# Patient Record
(demographics unavailable — no encounter records)

---

## 2024-11-11 NOTE — DISCUSSION/SUMMARY
[FreeTextEntry1] : Reassure patient.  I just Wegovy.  Labs sent including lipids, hemoglobin A1c, proBNP.  If all okay will follow-up in 6 months as he returns to Dr. Austin his PCP and has a follow-up appointment coming up with his endocrinologist. [EKG obtained to assist in diagnosis and management of assessed problem(s)] : EKG obtained to assist in diagnosis and management of assessed problem(s)

## 2024-11-11 NOTE — CARDIOLOGY SUMMARY
[de-identified] : December 6, 2022.  Exercised 11 minutes to a heart rate of 160 and a blood pressure of 158/98.  Fatigue but no chest pain.  Minimal upsloping ST depressions not meeting criteria for ischemia and returning to baseline by 1 minute recovery.  Rare VPCs with 1 couplet in recovery. [de-identified] : December 6, 2022.  MAC with mild MR.  Calcified trileaflet aortic valve with normal opening.  No AI.  Aorta 3.6 at the sinus of Valsalva and 3.5 cm ascending aorta.  Normal left atrium.  Normal LV size and systolic function with LVEF 72%.  Normal diastolic function.  Normal RV size and function with mild TR.  RVSP 37.  Normal pericardium with no effusion.

## 2024-11-11 NOTE — ASSESSMENT
[FreeTextEntry1] : 69-year-old with multiple risk factors including family history. Fatigue since COVID. EKG with some nonspecific changes and may be minimal IVCD. No significant findings on exam. Blood pressure had been controlled until he had to stop the diuretic part of his Hyzaar because of 2 gout attacks. Now blood pressure running high even on 100 mg of losartan. I added 5 mg of amlodipine and cut the losartan back to 50. Warned him about possible edema and reassured him it is not dangerous if he does get some. His blood pressure continues to run high so he saw a rheumatologist who gave him allopurinol 100 mg daily (along with a short course of colchicine 0.6 daily) and therefore he went back on his own to [facility] and has had excellent blood pressure response once again. He is happy staying with that even if he gets erectile dysfunction from the diuretic. He did get a blood pressure cuff at home and his numbers are mostly in the 120s although occasionally diastolic is high 80s; he will bring the cuff with him on his next visit with Dr. Austin or his follow-up visit with me in November which will be 1 year from his last visit. He had recent labs with Dr. Austin so I will obtain those and review them as well. Patient returned January 16, 2024. Blood pressure has been good with the diuretic part of Hyzaar and he has not had a recurrence of any gout while taking allopurinol 200 mg a day. No significant symptoms other than his current URI for which she is on prednisone as mentioned above related to his previous lung damage from COVID. He asked about Ozempic or 1 of those medicines just for a few months and I reiterated how everyone who stops them puts the weight back but they might be some logic for staying on it long-term if he has enough cardiac risk factors, metabolic syndrome, fatty liver etc. He was also very interested in a coronary artery calcium score. He was part of a study at [facility] over 30 years ago and he had a low score then but not 0. I explained that it was not likely things will change much based on the score although perhaps we would get even stricter about his LDL level etc. and he wanted to test so I gave him a requisition for it and a phone number to schedule it through [facility]. The meantime labs were sent and we will review his medicines and adjust them based on his labs and his calcium score. His EKG was sinus rhythm at 68 and otherwise unremarkable. Patient returned April 12, 2024 after gradual workup for calcium score to CTA finally led to coronary angiography and he had 90% lesion in both his proximal and mid LAD that were both treated with drug-eluting stents. He is doing fine with just some soreness in his right arm. His exam is unremarkable except he has now lost 15 pounds with GLP-1 agonists. Blood pressure is excellent. Good pedal pulses. EKG is normal sinus rhythm and remains in normal tracing. Reviewed all of the current issues. We will try to keep his LDL below 70 so we may have to increase his atorvastatin depending on today's results. He now wants to try to get approved for Wegovy because of cost. We discussed issues involving dual antiplatelet therapy going forward which we will try to continue for 6 months. Patient returned July 30, 2024. He has lost 31 pounds between diet and Wegovy now 1.0 mg weekly. No cardiac symptoms. Because of the weight loss and his blood pressure numbers at home he decided to stop his Hyzaar 2 weeks before his appointment with me. His home readings are still excellent and the reading I got here was satisfactory as well so it is okay to stay off the Hyzaar for now. He will monitor his blood pressure at home just a couple of times a week and call me if he notices it going up. He claims his A1c was down to 4 back in April from the Wegovy but he has not had any visual or retinal issues. His EKG remains sinus rhythm and within normal limits.  Patient returns today November 11, 2024.  He had his Wegovy increased to 1.7 mg weekly but now feels like he is losing too much weight and wants to cut back.  He will discuss this with his endocrinologist coming up soon and if that is okay I will cut him back to 1.0 mg weekly.  No cardiac symptoms or complaints.  His EKG is sinus rhythm at 68 and otherwise within normal limits.  He is now more than 6 months since his stent so he no longer needs DAPT so we decided to stop the aspirin and just continue the clopidogrel (which might also be better for his GERD).  Still anxious about his fluctuating blood pressure and again I tried to reassure him.  I am perfectly happy with his blood pressure of 124/80 at the end of the exam today.

## 2024-11-11 NOTE — PHYSICAL EXAM
[Well Developed] : well developed [Well Nourished] : well nourished [No Acute Distress] : no acute distress [Normal Conjunctiva] : normal conjunctiva [Normal Venous Pressure] : normal venous pressure [No Carotid Bruit] : no carotid bruit [Normal S1, S2] : normal S1, S2 [No Murmur] : no murmur [No Rub] : no rub [No Gallop] : no gallop [Clear Lung Fields] : clear lung fields [Good Air Entry] : good air entry [No Respiratory Distress] : no respiratory distress  [Soft] : abdomen soft [Non Tender] : non-tender [No Masses/organomegaly] : no masses/organomegaly [Normal Bowel Sounds] : normal bowel sounds [Normal Gait] : normal gait [No Edema] : no edema [No Cyanosis] : no cyanosis [No Clubbing] : no clubbing [No Varicosities] : no varicosities [Normal Radial B/L] : normal radial B/L [Normal PT B/L] : normal PT B/L [Normal DP B/L] : normal DP B/L [No Rash] : no rash [No Skin Lesions] : no skin lesions [Moves all extremities] : moves all extremities [No Focal Deficits] : no focal deficits [Normal Speech] : normal speech [Normal] : alert and oriented, normal memory [Alert and Oriented] : alert and oriented [Normal memory] : normal memory [de-identified] : Mild central obesity much less [de-identified] : Right radial pulse okay

## 2024-11-11 NOTE — REASON FOR VISIT
[FreeTextEntry1] : 69-year-old man with family history and other risk factors for coronary disease. Now status post 2 stents to his LAD April 9, 2024

## 2024-11-11 NOTE — REVIEW OF SYSTEMS
[Weight Loss (___ Lbs)] : [unfilled] ~Ulb weight loss [Dyspnea on exertion] : dyspnea during exertion [Negative] : Heme/Lymph [Weight Gain (___ Lbs)] : no recent weight gain [Feeling Fatigued] : not feeling fatigued [Chest Discomfort] : no chest discomfort [Lower Ext Edema] : no extremity edema [Palpitations] : no palpitations [Orthopnea] : no orthopnea [PND] : no PND [Syncope] : no syncope [FreeTextEntry5] : See HPI [FreeTextEntry6] : SANJAY [FreeTextEntry8] : History of kidney stones

## 2024-11-11 NOTE — HISTORY OF PRESENT ILLNESS
[FreeTextEntry1] : 2022.  Patient here for cardiac evaluation.  He is almost 68 years old.  His father  at 54 and had had multiple bypass surgeries although he was a 4 pack a day smoker.  His mother was found dead during COVID unclear etiology she was 89.  He has 1 sister that has hyperlipidemia.  Patient himself has well-controlled hypertension and hyperlipidemia and thinks he may have borderline diabetes.  He had a couple of admissions or evaluations in the emergency room to rule out heart attacks mostly due to his anxiety and he did have a normal nuclear stress test back then.  Most recent was 2 years ago right before COVID he saw cardiologist and had a plain stress test which was negative and he reassured him.  Patient had COVID early on in 2020 with bilateral pneumonia, 1 day hospitalization, follow-up with pulmonary with frequent chest CTs until the scar tissue cleared.  He has been fatigued he thinks since then.  He is also concerned because he does have dyspnea on exertion but no specific chest pain syndrome.  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  He is currently on Hyzaar and Lipitor.  His EKG is sinus rhythm at 68 and mostly within normal limits.  He has obstructive sleep apnea and has been using his machine for 15 years he thinks successfully 2022. Patient return for stress test and echocardiogram.  His echo had mild MR, and mildly calcified aortic valve but normal opening and no AI, normal LV and RV function.  RVSP 37.  Normal diastolic function.  He was able to exercise for 11 minutes to a heart rate of 160 and had fatigue but no chest pain.  Just minimal upsloping ST depressions not meeting criteria for ischemia and returning to baseline by 1 minute recovery. I reviewed all the above with the patient.  We will repeat the echo in 1 year, may be a stress test as well depending on his visit at that time.  He wanted to go over again his lab results which include an HDL of 39 but his LDL is 50 and his hemoglobin A1c is 5.4 so I am very satisfied with these results.  proBNP was normal as well.  2023.Patient called concerned because he had a chest x-ray with Dr. Austin that showed atherosclerotic calcification of the thoracic aorta.  I reassured him that this is a common finding, that does not mean he has intravascular obstruction, and that we just continue monitoring his risk factors controlling his cholesterol sugar and blood pressure etc. and he just had a recent stress test as well.  May 18, 2023.  Patient here in follow-up.  A lot going on in the interim.  Had a bout with transient global amnesia and then had another bout where he went to the emergency room thinking he had appendicitis but the scan was negative and everything resolved.  He also had a couple of gout attacks so finally his Hyzaar was changed to plain losartan but on 50 mg his blood pressure was high.  Dr. Austin increased him to 100 mg but the blood pressure still stayed high.  So now he was sent back here for further evaluation.  Only other issue is some anemia and his gastroenterologist moved up his colonoscopy from next February to next month.  No issues with chest pain shortness of breath etc. 2023.  Patient returned because he wanted to discuss blood pressure issues.  It seems on the new regimen I put him on without the diuretic his blood pressure remained elevated.  He saw a rheumatologist who agreed to give him allopurinol 100 mg along with a brief course of colchicine 0.6, and let him go back on the diuretic.  He went back to Hyzaar and his blood pressure numbers have been excellent.  If anything they are a little higher at home on his home monitor that he brought so he will bring it in next time either with me or Dr. Austin to make sure of its accuracy.  We also reviewed a number of other issues as well as some of which we had discussed previously 2024.  Patient returns in follow-up.  Doing well overall except has bronchitis and ever since he had COVID which damaged his lungs whenever he gets a cold it turns into bed bronchitis so he is now on prednisone 20 mg a day for the last 4 days.  Otherwise no interval medical or cardiac issues.  He did at the end ask about a coronary calcium score and after discussing the pros and cons he would still like to have one done.  No other interval medical issues hospitalizations etc.  We reviewed all his medications and he is on a good blood pressure regimen with Hyzaar 50/12.5 right now.  Still on atorvastatin 20. 2024 Had calcium score of 848, with 31 in the left main, 425 LAD, 61 left circumflex, 331 RCA. Last stress echo was 2022 with some upsloping ST depressions and a negative echo. Reviewed results with patient and we will schedule another stress echo. 2024 Patient came for stress echo due to the very high coronary artery calcium score. He was able to exercise for 9 minutes and 40 seconds to a heart rate of 150 and a blood pressure of 206/98. No EKG changes just mildly frequent VPCs. No echocardiographic evidence of ischemia. Patient at peak had small area of chest pain which then resolved very early in recovery. It looks like the NSTs were maybe starting to go down but they went back to baseline at just 30 seconds of recovery and stayed there throughout recovery and as mentioned again no echo evidence of ischemia. After discussion with the patient on the one hand it seems the symptom was very fleeting and mild and he has had it before without exertion or by the same token he was very concerned with a high score and with his family history and he wanted to do a CT angio of the coronaries. After much discussion I agreed.  2024.CTA of the coronaries done. Suggestive of at least moderate stenosis in proximal to mid LAD, moderate to severe in distal LAD, and severe and distal circumflex. Long discussion with patient. Prefers to have angiogram which I will schedule for him. In the meantime has lost 15 pounds with his GLP-1 agonist and would be interested in changing to Wegovy as it does have an indication for cardiac disease as well. 2024.  Cath on  showed a 90% proximal LAD stenosis and a 90% mid LAD stenosis that were both treated with drug-eluting stents. There was also a 30% stenosis in the mid RCA and 40% in the distal RCA. Patient was discharged without problems and will be seeing me in follow-up before he goes away. DAPT for 6 months. 2024. Patient here today.  EKG is sinus rhythm at 24 and a normal tracing.  Blood pressure excellent.  Weight is down about 15 pounds. His arm is a little sore this in the right arm (he says he felt the catheter going up his arm and it was a little uncomfortable) but I reassured him that that will probably go away in a few days.  We discussed the dual antiplatelet therapy.  He has been taking Zepound but he is trying to switch over to Wegovy 0.5 mg weekly and we will try to prescribe that.  We also discussed getting his LDL below 70 and we discussed his mildly elevated ESQUIVEL score in January but given his weight loss he wants to wait and repeat that may be in 6 months or longer.  2024.  Patient returns in follow-up. He has continued to lose weight with diet and Wegovy now 1.0 mg.  Because his weight was coming down he stopped his Hyzaar and made an appointment to come see me 2 weeks later to see about his blood pressure.  His home numbers have been very good although starting to creep up a little and that got him nervous.  Absolutely no cardiac symptoms with exertion etc.  No other change in his medications. His EKG is sinus rhythm at 60 and a totally normal tracing. 2024.  Patient returns in follow-up.  He is doing so well on Wegovy in terms of weight loss that he does not want to lose anymore.  First he tried to make it every other week but that did not seem to agree with him so he is asking about cutting back to 1.0 mg.  He is seeing his endocrinologist later on this week about his thyroid so I told him to check with the endocrinologist as well but most likely that is what we will do.  Meanwhile it is more than 6 months now since his stents so we will stop aspirin and just continue the clopidogrel.  He is in good spirits with no other complaints or issues but always worried about his blood pressure and again I reassured him about fluctuations in blood pressure etc.  His repeat blood pressure at the end of the exam today was perfectly normal.  He has no chest pain shortness of breath etc.

## 2025-03-03 NOTE — ASSESSMENT
[FreeTextEntry1] : 70-year-old with multiple risk factors including family history. Fatigue since COVID. EKG with some nonspecific changes and may be minimal IVCD. No significant findings on exam. Blood pressure had been controlled until he had to stop the diuretic part of his Hyzaar because of 2 gout attacks. Now blood pressure running high even on 100 mg of losartan. I added 5 mg of amlodipine and cut the losartan back to 50. Warned him about possible edema and reassured him it is not dangerous if he does get some. His blood pressure continues to run high so he saw a rheumatologist who gave him allopurinol 100 mg daily (along with a short course of colchicine 0.6 daily) and therefore he went back on his own to [facility] and has had excellent blood pressure response once again. He is happy staying with that even if he gets erectile dysfunction from the diuretic. He did get a blood pressure cuff at home and his numbers are mostly in the 120s although occasionally diastolic is high 80s; he will bring the cuff with him on his next visit with Dr. Austin or his follow-up visit with me in November which will be 1 year from his last visit. He had recent labs with Dr. Austin so I will obtain those and review them as well. Patient returned January 16, 2024. Blood pressure has been good with the diuretic part of Hyzaar and he has not had a recurrence of any gout while taking allopurinol 200 mg a day. No significant symptoms other than his current URI for which she is on prednisone as mentioned above related to his previous lung damage from COVID. He asked about Ozempic or 1 of those medicines just for a few months and I reiterated how everyone who stops them puts the weight back but they might be some logic for staying on it long-term if he has enough cardiac risk factors, metabolic syndrome, fatty liver etc. He was also very interested in a coronary artery calcium score. He was part of a study at [facility] over 30 years ago and he had a low score then but not 0. I explained that it was not likely things will change much based on the score although perhaps we would get even stricter about his LDL level etc. and he wanted to test so I gave him a requisition for it and a phone number to schedule it through [facility]. The meantime labs were sent and we will review his medicines and adjust them based on his labs and his calcium score. His EKG was sinus rhythm at 68 and otherwise unremarkable. Patient returned April 12, 2024 after gradual workup for calcium score to CTA finally led to coronary angiography and he had 90% lesion in both his proximal and mid LAD that were both treated with drug-eluting stents. He is doing fine with just some soreness in his right arm. His exam is unremarkable except he has now lost 15 pounds with GLP-1 agonists. Blood pressure is excellent. Good pedal pulses. EKG is normal sinus rhythm and remains in normal tracing. Reviewed all of the current issues. We will try to keep his LDL below 70 so we may have to increase his atorvastatin depending on today's results. He now wants to try to get approved for Wegovy because of cost. We discussed issues involving dual antiplatelet therapy going forward which we will try to continue for 6 months. Patient returned July 30, 2024. He has lost 31 pounds between diet and Wegovy now 1.0 mg weekly. No cardiac symptoms. Because of the weight loss and his blood pressure numbers at home he decided to stop his Hyzaar 2 weeks before his appointment with me. His home readings are still excellent and the reading I got here was satisfactory as well so it is okay to stay off the Hyzaar for now. He will monitor his blood pressure at home just a couple of times a week and call me if he notices it going up. He claims his A1c was down to 4 back in April from the Wegovy but he has not had any visual or retinal issues. His EKG remains sinus rhythm and within normal limits. Patient returned November 11, 2024. He had his Wegovy increased to 1.7 mg weekly but now feels like he is losing too much weight and wants to cut back. He will discuss this with his endocrinologist coming up soon and if that is okay I will cut him back to 1.0 mg weekly. No cardiac symptoms or complaints. His EKG is sinus rhythm at 68 and otherwise within normal limits. He is now more than 6 months since his stent so he no longer needs DAPT so we decided to stop the aspirin and just continue the clopidogrel (which might also be better for his GERD). Still anxious about his fluctuating blood pressure and again I tried to reassure him. I am perfectly happy with his blood pressure of 124/80 at the end of the exam today.      Patient returns today March 3, 2025 needing cardiac clearance for right arthroscopic shoulder surgery and had an echocardiogram as well.  He is stable and asymptomatic with no exertional chest pain or shortness of breath.  Only issue was an episode of epistaxis about 3 to 4 weeks ago which resolved.  He remains on Plavix but will switch to baby aspirin 8 days prior to the surgery because of his LAD stent..  He has lost a lot of weight on Wegovy but will stop that preop as per the orthopedic surgeons wishes.  His exam today is unremarkable and his EKG shows normal sinus rhythm at 65 and is a normal tracing.  He had an echocardiogram that he was scheduled for showing normal LV systolic function with an ejection fraction of 65 to 70% with no wall motion abnormalities.  Just has mild MR and mild TR and this was basically unchanged from 2 years ago.

## 2025-03-03 NOTE — CARDIOLOGY SUMMARY
[de-identified] : December 6, 2022.  Exercised 11 minutes to a heart rate of 160 and a blood pressure of 158/98.  Fatigue but no chest pain.  Minimal upsloping ST depressions not meeting criteria for ischemia and returning to baseline by 1 minute recovery.  Rare VPCs with 1 couplet in recovery. [de-identified] : December 6, 2022.  MAC with mild MR.  Calcified trileaflet aortic valve with normal opening.  No AI.  Aorta 3.6 at the sinus of Valsalva and 3.5 cm ascending aorta.  Normal left atrium.  Normal LV size and systolic function with LVEF 72%.  Normal diastolic function.  Normal RV size and function with mild TR.  RVSP 37.  Normal pericardium with no effusion.

## 2025-03-03 NOTE — HISTORY OF PRESENT ILLNESS
[Preoperative Visit] : for a medical evaluation prior to surgery [Scheduled Procedure ___] : a [unfilled] [Date of Surgery ___] : on [unfilled] [Surgeon Name ___] : surgeon: [unfilled] [Fever] : no fever [Chills] : no chills [Fatigue] : no fatigue [Chest Pain] : no chest pain [Cough] : no cough [Dyspnea] : no dyspnea [Dysuria] : no dysuria [Urinary Frequency] : no urinary frequency [Nausea] : no nausea [Vomiting] : no vomiting [Diarrhea] : no diarrhea [Abdominal Pain] : no abdominal pain [Easy Bruising] : no easy bruising [Lower Extremity Swelling] : no lower extremity swelling [Poor Exercise Tolerance] : no poor exercise tolerance [Diabetes] : no diabetes [Cardiovascular Disease] : cardiovascular disease [Pulmonary Disease] : no pulmonary disease [Anti-Platelet Agents] : anti-platelet agents [Nicotine Dependence] : no nicotine dependence [Alcohol Use] : no  alcohol use [Renal Disease] : no renal disease [GI Disease] : no gastrointestinal disease [Sleep Apnea] : no sleep apnea [Thromboembolic Problems] : no thromboembolic problems [Frequent use of NSAIDs] : no use of NSAIDs [Transfusion Reaction] : no transfusion reaction [Impaired Immunity] : no impaired immunity [Steroid Use in Last 6 Months] : no steroid use in the last six months [Frequent Aspirin Use] : frequent aspirin use [Prior Anesthesia] : Prior anesthesia [Prev Anesthesia Reaction] : no previous anesthesia reaction [Electrocardiogram] : ~T an ECG ~C was performed [Echocardiogram] : ~T an echocardiogram ~C was performed [Cardiac Catheterization  (Diagnostic)] : cardiac catheterization ~T ~C was performed [Cardiovascular Stress Test] : a cardiac stress test ~T ~C was performed [Metabolic Capacity ___Mets%] : The patient has a metabolic capacity of [unfilled] Mets%  [Good] : Good [Anesthesia Reaction] : no anesthesia reaction [Sudden Death] : no sudden death [Clotting Disorder] : no clotting disorder [Bleeding Disorder] : no bleeding disorder [de-identified] : Recent epistaxis [FreeTextEntry1] : 2022.  Patient here for cardiac evaluation.  He is almost 68 years old.  His father  at 54 and had had multiple bypass surgeries although he was a 4 pack a day smoker.  His mother was found dead during COVID unclear etiology she was 89.  He has 1 sister that has hyperlipidemia.  Patient himself has well-controlled hypertension and hyperlipidemia and thinks he may have borderline diabetes.  He had a couple of admissions or evaluations in the emergency room to rule out heart attacks mostly due to his anxiety and he did have a normal nuclear stress test back then.  Most recent was 2 years ago right before COVID he saw cardiologist and had a plain stress test which was negative and he reassured him.  Patient had COVID early on in 2020 with bilateral pneumonia, 1 day hospitalization, follow-up with pulmonary with frequent chest CTs until the scar tissue cleared.  He has been fatigued he thinks since then.  He is also concerned because he does have dyspnea on exertion but no specific chest pain syndrome.  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  He is currently on Hyzaar and Lipitor.  His EKG is sinus rhythm at 68 and mostly within normal limits.  He has obstructive sleep apnea and has been using his machine for 15 years he thinks successfully 2022. Patient return for stress test and echocardiogram.  His echo had mild MR, and mildly calcified aortic valve but normal opening and no AI, normal LV and RV function.  RVSP 37.  Normal diastolic function.  He was able to exercise for 11 minutes to a heart rate of 160 and had fatigue but no chest pain.  Just minimal upsloping ST depressions not meeting criteria for ischemia and returning to baseline by 1 minute recovery. I reviewed all the above with the patient.  We will repeat the echo in 1 year, may be a stress test as well depending on his visit at that time.  He wanted to go over again his lab results which include an HDL of 39 but his LDL is 50 and his hemoglobin A1c is 5.4 so I am very satisfied with these results.  proBNP was normal as well.  2023.Patient called concerned because he had a chest x-ray with Dr. Austin that showed atherosclerotic calcification of the thoracic aorta.  I reassured him that this is a common finding, that does not mean he has intravascular obstruction, and that we just continue monitoring his risk factors controlling his cholesterol sugar and blood pressure etc. and he just had a recent stress test as well.  May 18, 2023.  Patient here in follow-up.  A lot going on in the interim.  Had a bout with transient global amnesia and then had another bout where he went to the emergency room thinking he had appendicitis but the scan was negative and everything resolved.  He also had a couple of gout attacks so finally his Hyzaar was changed to plain losartan but on 50 mg his blood pressure was high.  Dr. Austin increased him to 100 mg but the blood pressure still stayed high.  So now he was sent back here for further evaluation.  Only other issue is some anemia and his gastroenterologist moved up his colonoscopy from next February to next month.  No issues with chest pain shortness of breath etc. 2023.  Patient returned because he wanted to discuss blood pressure issues.  It seems on the new regimen I put him on without the diuretic his blood pressure remained elevated.  He saw a rheumatologist who agreed to give him allopurinol 100 mg along with a brief course of colchicine 0.6, and let him go back on the diuretic.  He went back to Hyzaar and his blood pressure numbers have been excellent.  If anything they are a little higher at home on his home monitor that he brought so he will bring it in next time either with me or Dr. Austin to make sure of its accuracy.  We also reviewed a number of other issues as well as some of which we had discussed previously 2024.  Patient returns in follow-up.  Doing well overall except has bronchitis and ever since he had COVID which damaged his lungs whenever he gets a cold it turns into bed bronchitis so he is now on prednisone 20 mg a day for the last 4 days.  Otherwise no interval medical or cardiac issues.  He did at the end ask about a coronary calcium score and after discussing the pros and cons he would still like to have one done.  No other interval medical issues hospitalizations etc.  We reviewed all his medications and he is on a good blood pressure regimen with Hyzaar 50/12.5 right now.  Still on atorvastatin 20. 2024 Had calcium score of 848, with 31 in the left main, 425 LAD, 61 left circumflex, 331 RCA. Last stress echo was 2022 with some upsloping ST depressions and a negative echo. Reviewed results with patient and we will schedule another stress echo. 2024 Patient came for stress echo due to the very high coronary artery calcium score. He was able to exercise for 9 minutes and 40 seconds to a heart rate of 150 and a blood pressure of 206/98. No EKG changes just mildly frequent VPCs. No echocardiographic evidence of ischemia. Patient at peak had small area of chest pain which then resolved very early in recovery. It looks like the NSTs were maybe starting to go down but they went back to baseline at just 30 seconds of recovery and stayed there throughout recovery and as mentioned again no echo evidence of ischemia. After discussion with the patient on the one hand it seems the symptom was very fleeting and mild and he has had it before without exertion or by the same token he was very concerned with a high score and with his family history and he wanted to do a CT angio of the coronaries. After much discussion I agreed.  2024.CTA of the coronaries done. Suggestive of at least moderate stenosis in proximal to mid LAD, moderate to severe in distal LAD, and severe and distal circumflex. Long discussion with patient. Prefers to have angiogram which I will schedule for him. In the meantime has lost 15 pounds with his GLP-1 agonist and would be interested in changing to Wegovy as it does have an indication for cardiac disease as well. 2024.  Cath on  showed a 90% proximal LAD stenosis and a 90% mid LAD stenosis that were both treated with drug-eluting stents. There was also a 30% stenosis in the mid RCA and 40% in the distal RCA. Patient was discharged without problems and will be seeing me in follow-up before he goes away. DAPT for 6 months. 2024. Patient here today.  EKG is sinus rhythm at 24 and a normal tracing.  Blood pressure excellent.  Weight is down about 15 pounds. His arm is a little sore this in the right arm (he says he felt the catheter going up his arm and it was a little uncomfortable) but I reassured him that that will probably go away in a few days.  We discussed the dual antiplatelet therapy.  He has been taking Zepound but he is trying to switch over to Wegovy 0.5 mg weekly and we will try to prescribe that.  We also discussed getting his LDL below 70 and we discussed his mildly elevated ESQUIVEL score in January but given his weight loss he wants to wait and repeat that may be in 6 months or longer.  2024.  Patient returns in follow-up. He has continued to lose weight with diet and Wegovy now 1.0 mg.  Because his weight was coming down he stopped his Hyzaar and made an appointment to come see me 2 weeks later to see about his blood pressure.  His home numbers have been very good although starting to creep up a little and that got him nervous.  Absolutely no cardiac symptoms with exertion etc.  No other change in his medications. His EKG is sinus rhythm at 60 and a totally normal tracing. 2024.  Patient returns in follow-up.  He is doing so well on Wegovy in terms of weight loss that he does not want to lose anymore.  First he tried to make it every other week but that did not seem to agree with him so he is asking about cutting back to 1.0 mg.  He is seeing his endocrinologist later on this week about his thyroid so I told him to check with the endocrinologist as well but most likely that is what we will do.  Meanwhile it is more than 6 months now since his stents so we will stop aspirin and just continue the clopidogrel.  He is in good spirits with no other complaints or issues but always worried about his blood pressure and again I reassured him about fluctuations in blood pressure etc.  His repeat blood pressure at the end of the exam today was perfectly normal.  He has no chest pain shortness of breath etc.  March 3, 2025.  Patient returns in follow-up needing a cardiac clearance for arthroscopic right shoulder surgery for rotator cuff tear.  Since his stent he has been asymptomatic and is now only on single antiplatelet agent as it is more than 6 months from his stent.  He did have an episode about 3 to 4 weeks ago of severe epistaxis and had to have some nasal packing until it stopped but this has not recurred.  Currently he is on Plavix as the single antiplatelet agent but prior to the surgery he will be switched to baby aspirin for the duration of the surgery and then afterwards will be switched back.  He denies chest pain shortness of breath etc.

## 2025-03-03 NOTE — ASSESSMENT
[FreeTextEntry1] : 70-year-old with multiple risk factors including family history. Fatigue since COVID. EKG with some nonspecific changes and may be minimal IVCD. No significant findings on exam. Blood pressure had been controlled until he had to stop the diuretic part of his Hyzaar because of 2 gout attacks. Now blood pressure running high even on 100 mg of losartan. I added 5 mg of amlodipine and cut the losartan back to 50. Warned him about possible edema and reassured him it is not dangerous if he does get some. His blood pressure continues to run high so he saw a rheumatologist who gave him allopurinol 100 mg daily (along with a short course of colchicine 0.6 daily) and therefore he went back on his own to [facility] and has had excellent blood pressure response once again. He is happy staying with that even if he gets erectile dysfunction from the diuretic. He did get a blood pressure cuff at home and his numbers are mostly in the 120s although occasionally diastolic is high 80s; he will bring the cuff with him on his next visit with Dr. Austin or his follow-up visit with me in November which will be 1 year from his last visit. He had recent labs with Dr. Austin so I will obtain those and review them as well. Patient returned January 16, 2024. Blood pressure has been good with the diuretic part of Hyzaar and he has not had a recurrence of any gout while taking allopurinol 200 mg a day. No significant symptoms other than his current URI for which she is on prednisone as mentioned above related to his previous lung damage from COVID. He asked about Ozempic or 1 of those medicines just for a few months and I reiterated how everyone who stops them puts the weight back but they might be some logic for staying on it long-term if he has enough cardiac risk factors, metabolic syndrome, fatty liver etc. He was also very interested in a coronary artery calcium score. He was part of a study at [facility] over 30 years ago and he had a low score then but not 0. I explained that it was not likely things will change much based on the score although perhaps we would get even stricter about his LDL level etc. and he wanted to test so I gave him a requisition for it and a phone number to schedule it through [facility]. The meantime labs were sent and we will review his medicines and adjust them based on his labs and his calcium score. His EKG was sinus rhythm at 68 and otherwise unremarkable. Patient returned April 12, 2024 after gradual workup for calcium score to CTA finally led to coronary angiography and he had 90% lesion in both his proximal and mid LAD that were both treated with drug-eluting stents. He is doing fine with just some soreness in his right arm. His exam is unremarkable except he has now lost 15 pounds with GLP-1 agonists. Blood pressure is excellent. Good pedal pulses. EKG is normal sinus rhythm and remains in normal tracing. Reviewed all of the current issues. We will try to keep his LDL below 70 so we may have to increase his atorvastatin depending on today's results. He now wants to try to get approved for Wegovy because of cost. We discussed issues involving dual antiplatelet therapy going forward which we will try to continue for 6 months. Patient returned July 30, 2024. He has lost 31 pounds between diet and Wegovy now 1.0 mg weekly. No cardiac symptoms. Because of the weight loss and his blood pressure numbers at home he decided to stop his Hyzaar 2 weeks before his appointment with me. His home readings are still excellent and the reading I got here was satisfactory as well so it is okay to stay off the Hyzaar for now. He will monitor his blood pressure at home just a couple of times a week and call me if he notices it going up. He claims his A1c was down to 4 back in April from the Wegovy but he has not had any visual or retinal issues. His EKG remains sinus rhythm and within normal limits. Patient returned November 11, 2024. He had his Wegovy increased to 1.7 mg weekly but now feels like he is losing too much weight and wants to cut back. He will discuss this with his endocrinologist coming up soon and if that is okay I will cut him back to 1.0 mg weekly. No cardiac symptoms or complaints. His EKG is sinus rhythm at 68 and otherwise within normal limits. He is now more than 6 months since his stent so he no longer needs DAPT so we decided to stop the aspirin and just continue the clopidogrel (which might also be better for his GERD). Still anxious about his fluctuating blood pressure and again I tried to reassure him. I am perfectly happy with his blood pressure of 124/80 at the end of the exam today.      Patient returns today March 3, 2025 needing cardiac clearance for right arthroscopic shoulder surgery and had an echocardiogram as well.  He is stable and asymptomatic with no exertional chest pain or shortness of breath.  Only issue was an episode of epistaxis about 3 to 4 weeks ago which resolved.  He remains on Plavix but will switch to baby aspirin 8 days prior to the surgery because of his LAD stent..  He has lost a lot of weight on Wegovy but will stop that preop as per the orthopedic surgeons wishes.  His exam today is unremarkable and his EKG shows normal sinus rhythm at 65 and is a normal tracing.  He had an echocardiogram that he was scheduled for showing normal LV systolic function with an ejection fraction of 65 to 70% with no wall motion abnormalities.  Just has mild MR and mild TR and this was basically unchanged from 2 years ago.

## 2025-03-03 NOTE — PHYSICAL EXAM
[Well Developed] : well developed [Well Nourished] : well nourished [No Acute Distress] : no acute distress [Normal Conjunctiva] : normal conjunctiva [Normal Venous Pressure] : normal venous pressure [No Carotid Bruit] : no carotid bruit [Normal S1, S2] : normal S1, S2 [No Murmur] : no murmur [No Rub] : no rub [No Gallop] : no gallop [Clear Lung Fields] : clear lung fields [Good Air Entry] : good air entry [No Respiratory Distress] : no respiratory distress  [Soft] : abdomen soft [Non Tender] : non-tender [No Masses/organomegaly] : no masses/organomegaly [Normal Bowel Sounds] : normal bowel sounds [Normal Gait] : normal gait [No Edema] : no edema [No Cyanosis] : no cyanosis [No Clubbing] : no clubbing [No Varicosities] : no varicosities [Normal Radial B/L] : normal radial B/L [Normal PT B/L] : normal PT B/L [Normal DP B/L] : normal DP B/L [No Rash] : no rash [No Skin Lesions] : no skin lesions [Moves all extremities] : moves all extremities [No Focal Deficits] : no focal deficits [Normal Speech] : normal speech [Normal] : alert and oriented, normal memory [Alert and Oriented] : alert and oriented [Normal memory] : normal memory [de-identified] : Mild central obesity much less [de-identified] : Right radial pulse okay

## 2025-03-03 NOTE — DISCUSSION/SUMMARY
[Procedure Intermediate Risk] : the procedure risk is intermediate [Patient Intermediate Risk] : the patient is an intermediate risk [Optimized for Surgery Pending Laboratory Results] : the patient is optimized for surgery pending laboratory results [As per surgery] : as per surgery [Continue] : Continue medications as currently directed [FreeTextEntry3] : Continue all medications except stopping Wegovy and changing Plavix to aspirin 81 mg 8 days prior to the surgery. [FreeTextEntry1] : Patient is healthy and stable status post LAD stent about 9 to 10 months ago.  Must remain on antiplatelet agent but can be changed from Plavix to aspirin 81 mg 8 days prior to the surgery.  Normal LV function with normal exam EKG and echo as mentioned.  She did not be considered to be a significantly increased risk for the upcoming surgery.

## 2025-03-03 NOTE — HISTORY OF PRESENT ILLNESS
[Preoperative Visit] : for a medical evaluation prior to surgery [Scheduled Procedure ___] : a [unfilled] [Date of Surgery ___] : on [unfilled] [Surgeon Name ___] : surgeon: [unfilled] [Fever] : no fever [Chills] : no chills [Fatigue] : no fatigue [Chest Pain] : no chest pain [Cough] : no cough [Dyspnea] : no dyspnea [Dysuria] : no dysuria [Urinary Frequency] : no urinary frequency [Nausea] : no nausea [Vomiting] : no vomiting [Diarrhea] : no diarrhea [Abdominal Pain] : no abdominal pain [Easy Bruising] : no easy bruising [Lower Extremity Swelling] : no lower extremity swelling [Poor Exercise Tolerance] : no poor exercise tolerance [Diabetes] : no diabetes [Cardiovascular Disease] : cardiovascular disease [Pulmonary Disease] : no pulmonary disease [Anti-Platelet Agents] : anti-platelet agents [Nicotine Dependence] : no nicotine dependence [Alcohol Use] : no  alcohol use [Renal Disease] : no renal disease [GI Disease] : no gastrointestinal disease [Sleep Apnea] : no sleep apnea [Thromboembolic Problems] : no thromboembolic problems [Frequent use of NSAIDs] : no use of NSAIDs [Transfusion Reaction] : no transfusion reaction [Impaired Immunity] : no impaired immunity [Steroid Use in Last 6 Months] : no steroid use in the last six months [Frequent Aspirin Use] : frequent aspirin use [Prior Anesthesia] : Prior anesthesia [Prev Anesthesia Reaction] : no previous anesthesia reaction [Electrocardiogram] : ~T an ECG ~C was performed [Echocardiogram] : ~T an echocardiogram ~C was performed [Cardiac Catheterization  (Diagnostic)] : cardiac catheterization ~T ~C was performed [Cardiovascular Stress Test] : a cardiac stress test ~T ~C was performed [Metabolic Capacity ___Mets%] : The patient has a metabolic capacity of [unfilled] Mets%  [Good] : Good [Anesthesia Reaction] : no anesthesia reaction [Sudden Death] : no sudden death [Clotting Disorder] : no clotting disorder [Bleeding Disorder] : no bleeding disorder [de-identified] : Recent epistaxis [FreeTextEntry1] : 2022.  Patient here for cardiac evaluation.  He is almost 68 years old.  His father  at 54 and had had multiple bypass surgeries although he was a 4 pack a day smoker.  His mother was found dead during COVID unclear etiology she was 89.  He has 1 sister that has hyperlipidemia.  Patient himself has well-controlled hypertension and hyperlipidemia and thinks he may have borderline diabetes.  He had a couple of admissions or evaluations in the emergency room to rule out heart attacks mostly due to his anxiety and he did have a normal nuclear stress test back then.  Most recent was 2 years ago right before COVID he saw cardiologist and had a plain stress test which was negative and he reassured him.  Patient had COVID early on in 2020 with bilateral pneumonia, 1 day hospitalization, follow-up with pulmonary with frequent chest CTs until the scar tissue cleared.  He has been fatigued he thinks since then.  He is also concerned because he does have dyspnea on exertion but no specific chest pain syndrome.  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  He is currently on Hyzaar and Lipitor.  His EKG is sinus rhythm at 68 and mostly within normal limits.  He has obstructive sleep apnea and has been using his machine for 15 years he thinks successfully 2022. Patient return for stress test and echocardiogram.  His echo had mild MR, and mildly calcified aortic valve but normal opening and no AI, normal LV and RV function.  RVSP 37.  Normal diastolic function.  He was able to exercise for 11 minutes to a heart rate of 160 and had fatigue but no chest pain.  Just minimal upsloping ST depressions not meeting criteria for ischemia and returning to baseline by 1 minute recovery. I reviewed all the above with the patient.  We will repeat the echo in 1 year, may be a stress test as well depending on his visit at that time.  He wanted to go over again his lab results which include an HDL of 39 but his LDL is 50 and his hemoglobin A1c is 5.4 so I am very satisfied with these results.  proBNP was normal as well.  2023.Patient called concerned because he had a chest x-ray with Dr. Austin that showed atherosclerotic calcification of the thoracic aorta.  I reassured him that this is a common finding, that does not mean he has intravascular obstruction, and that we just continue monitoring his risk factors controlling his cholesterol sugar and blood pressure etc. and he just had a recent stress test as well.  May 18, 2023.  Patient here in follow-up.  A lot going on in the interim.  Had a bout with transient global amnesia and then had another bout where he went to the emergency room thinking he had appendicitis but the scan was negative and everything resolved.  He also had a couple of gout attacks so finally his Hyzaar was changed to plain losartan but on 50 mg his blood pressure was high.  Dr. Austin increased him to 100 mg but the blood pressure still stayed high.  So now he was sent back here for further evaluation.  Only other issue is some anemia and his gastroenterologist moved up his colonoscopy from next February to next month.  No issues with chest pain shortness of breath etc. 2023.  Patient returned because he wanted to discuss blood pressure issues.  It seems on the new regimen I put him on without the diuretic his blood pressure remained elevated.  He saw a rheumatologist who agreed to give him allopurinol 100 mg along with a brief course of colchicine 0.6, and let him go back on the diuretic.  He went back to Hyzaar and his blood pressure numbers have been excellent.  If anything they are a little higher at home on his home monitor that he brought so he will bring it in next time either with me or Dr. Austin to make sure of its accuracy.  We also reviewed a number of other issues as well as some of which we had discussed previously 2024.  Patient returns in follow-up.  Doing well overall except has bronchitis and ever since he had COVID which damaged his lungs whenever he gets a cold it turns into bed bronchitis so he is now on prednisone 20 mg a day for the last 4 days.  Otherwise no interval medical or cardiac issues.  He did at the end ask about a coronary calcium score and after discussing the pros and cons he would still like to have one done.  No other interval medical issues hospitalizations etc.  We reviewed all his medications and he is on a good blood pressure regimen with Hyzaar 50/12.5 right now.  Still on atorvastatin 20. 2024 Had calcium score of 848, with 31 in the left main, 425 LAD, 61 left circumflex, 331 RCA. Last stress echo was 2022 with some upsloping ST depressions and a negative echo. Reviewed results with patient and we will schedule another stress echo. 2024 Patient came for stress echo due to the very high coronary artery calcium score. He was able to exercise for 9 minutes and 40 seconds to a heart rate of 150 and a blood pressure of 206/98. No EKG changes just mildly frequent VPCs. No echocardiographic evidence of ischemia. Patient at peak had small area of chest pain which then resolved very early in recovery. It looks like the NSTs were maybe starting to go down but they went back to baseline at just 30 seconds of recovery and stayed there throughout recovery and as mentioned again no echo evidence of ischemia. After discussion with the patient on the one hand it seems the symptom was very fleeting and mild and he has had it before without exertion or by the same token he was very concerned with a high score and with his family history and he wanted to do a CT angio of the coronaries. After much discussion I agreed.  2024.CTA of the coronaries done. Suggestive of at least moderate stenosis in proximal to mid LAD, moderate to severe in distal LAD, and severe and distal circumflex. Long discussion with patient. Prefers to have angiogram which I will schedule for him. In the meantime has lost 15 pounds with his GLP-1 agonist and would be interested in changing to Wegovy as it does have an indication for cardiac disease as well. 2024.  Cath on  showed a 90% proximal LAD stenosis and a 90% mid LAD stenosis that were both treated with drug-eluting stents. There was also a 30% stenosis in the mid RCA and 40% in the distal RCA. Patient was discharged without problems and will be seeing me in follow-up before he goes away. DAPT for 6 months. 2024. Patient here today.  EKG is sinus rhythm at 24 and a normal tracing.  Blood pressure excellent.  Weight is down about 15 pounds. His arm is a little sore this in the right arm (he says he felt the catheter going up his arm and it was a little uncomfortable) but I reassured him that that will probably go away in a few days.  We discussed the dual antiplatelet therapy.  He has been taking Zepound but he is trying to switch over to Wegovy 0.5 mg weekly and we will try to prescribe that.  We also discussed getting his LDL below 70 and we discussed his mildly elevated ESQUIVEL score in January but given his weight loss he wants to wait and repeat that may be in 6 months or longer.  2024.  Patient returns in follow-up. He has continued to lose weight with diet and Wegovy now 1.0 mg.  Because his weight was coming down he stopped his Hyzaar and made an appointment to come see me 2 weeks later to see about his blood pressure.  His home numbers have been very good although starting to creep up a little and that got him nervous.  Absolutely no cardiac symptoms with exertion etc.  No other change in his medications. His EKG is sinus rhythm at 60 and a totally normal tracing. 2024.  Patient returns in follow-up.  He is doing so well on Wegovy in terms of weight loss that he does not want to lose anymore.  First he tried to make it every other week but that did not seem to agree with him so he is asking about cutting back to 1.0 mg.  He is seeing his endocrinologist later on this week about his thyroid so I told him to check with the endocrinologist as well but most likely that is what we will do.  Meanwhile it is more than 6 months now since his stents so we will stop aspirin and just continue the clopidogrel.  He is in good spirits with no other complaints or issues but always worried about his blood pressure and again I reassured him about fluctuations in blood pressure etc.  His repeat blood pressure at the end of the exam today was perfectly normal.  He has no chest pain shortness of breath etc.  March 3, 2025.  Patient returns in follow-up needing a cardiac clearance for arthroscopic right shoulder surgery for rotator cuff tear.  Since his stent he has been asymptomatic and is now only on single antiplatelet agent as it is more than 6 months from his stent.  He did have an episode about 3 to 4 weeks ago of severe epistaxis and had to have some nasal packing until it stopped but this has not recurred.  Currently he is on Plavix as the single antiplatelet agent but prior to the surgery he will be switched to baby aspirin for the duration of the surgery and then afterwards will be switched back.  He denies chest pain shortness of breath etc.

## 2025-03-03 NOTE — PHYSICAL EXAM
[Well Developed] : well developed [Well Nourished] : well nourished [No Acute Distress] : no acute distress [Normal Conjunctiva] : normal conjunctiva [Normal Venous Pressure] : normal venous pressure [No Carotid Bruit] : no carotid bruit [Normal S1, S2] : normal S1, S2 [No Murmur] : no murmur [No Rub] : no rub [No Gallop] : no gallop [Clear Lung Fields] : clear lung fields [Good Air Entry] : good air entry [No Respiratory Distress] : no respiratory distress  [Soft] : abdomen soft [Non Tender] : non-tender [No Masses/organomegaly] : no masses/organomegaly [Normal Bowel Sounds] : normal bowel sounds [Normal Gait] : normal gait [No Edema] : no edema [No Cyanosis] : no cyanosis [No Clubbing] : no clubbing [No Varicosities] : no varicosities [Normal Radial B/L] : normal radial B/L [Normal PT B/L] : normal PT B/L [Normal DP B/L] : normal DP B/L [No Rash] : no rash [No Skin Lesions] : no skin lesions [Moves all extremities] : moves all extremities [No Focal Deficits] : no focal deficits [Normal Speech] : normal speech [Normal] : alert and oriented, normal memory [Alert and Oriented] : alert and oriented [Normal memory] : normal memory [de-identified] : Mild central obesity much less [de-identified] : Right radial pulse okay

## 2025-03-03 NOTE — REVIEW OF SYSTEMS
[Weight Gain (___ Lbs)] : no recent weight gain [Feeling Fatigued] : not feeling fatigued [Weight Loss (___ Lbs)] : [unfilled] ~Ulb weight loss [Dyspnea on exertion] : dyspnea during exertion [Chest Discomfort] : no chest discomfort [Lower Ext Edema] : no extremity edema [Palpitations] : no palpitations [Orthopnea] : no orthopnea [PND] : no PND [Syncope] : no syncope [Negative] : Heme/Lymph [FreeTextEntry2] : On Wegovy [FreeTextEntry4] : Epistaxis 3 to 4 weeks ago [FreeTextEntry5] : See HPI [FreeTextEntry6] : SANJAY [FreeTextEntry8] : History of kidney stones [FreeTextEntry9] : Right rotator cuff tear.  Gout in the past.

## 2025-05-12 NOTE — PHYSICAL EXAM
[Well Developed] : well developed [Well Nourished] : well nourished [No Acute Distress] : no acute distress [Normal Conjunctiva] : normal conjunctiva [Normal Gait] : normal gait [No Edema] : no edema [No Clubbing] : no clubbing [Normal Radial B/L] : normal radial B/L [Moves all extremities] : moves all extremities [No Focal Deficits] : no focal deficits [Normal Speech] : normal speech [Normal] : alert and oriented, normal memory [Alert and Oriented] : alert and oriented [Normal memory] : normal memory [de-identified] : Mild central obesity much less [de-identified] : Right radial pulse okay

## 2025-05-12 NOTE — REVIEW OF SYSTEMS
[Dyspnea on exertion] : dyspnea during exertion [Negative] : Heme/Lymph [Weight Gain (___ Lbs)] : no recent weight gain [Feeling Fatigued] : not feeling fatigued [Weight Loss (___ Lbs)] : no recent weight loss [Chest Discomfort] : no chest discomfort [Lower Ext Edema] : no extremity edema [Palpitations] : no palpitations [Orthopnea] : no orthopnea [PND] : no PND [Syncope] : no syncope [FreeTextEntry2] : On Wegovy [FreeTextEntry4] : Epistaxis 3 to 4 weeks ago [FreeTextEntry5] : See HPI [FreeTextEntry6] : SANJAY [FreeTextEntry8] : History of kidney stones [FreeTextEntry9] : Right rotator cuff tear.  Gout in the past.

## 2025-05-12 NOTE — HISTORY OF PRESENT ILLNESS
[FreeTextEntry1] : 2022.  Patient here for cardiac evaluation.  He is almost 68 years old.  His father  at 54 and had had multiple bypass surgeries although he was a 4 pack a day smoker.  His mother was found dead during COVID unclear etiology she was 89.  He has 1 sister that has hyperlipidemia.  Patient himself has well-controlled hypertension and hyperlipidemia and thinks he may have borderline diabetes.  He had a couple of admissions or evaluations in the emergency room to rule out heart attacks mostly due to his anxiety and he did have a normal nuclear stress test back then.  Most recent was 2 years ago right before COVID he saw cardiologist and had a plain stress test which was negative and he reassured him.  Patient had COVID early on in 2020 with bilateral pneumonia, 1 day hospitalization, follow-up with pulmonary with frequent chest CTs until the scar tissue cleared.  He has been fatigued he thinks since then.  He is also concerned because he does have dyspnea on exertion but no specific chest pain syndrome.  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  He is currently on Hyzaar and Lipitor.  His EKG is sinus rhythm at 68 and mostly within normal limits.  He has obstructive sleep apnea and has been using his machine for 15 years he thinks successfully 2022. Patient return for stress test and echocardiogram.  His echo had mild MR, and mildly calcified aortic valve but normal opening and no AI, normal LV and RV function.  RVSP 37.  Normal diastolic function.  He was able to exercise for 11 minutes to a heart rate of 160 and had fatigue but no chest pain.  Just minimal upsloping ST depressions not meeting criteria for ischemia and returning to baseline by 1 minute recovery. I reviewed all the above with the patient.  We will repeat the echo in 1 year, may be a stress test as well depending on his visit at that time.  He wanted to go over again his lab results which include an HDL of 39 but his LDL is 50 and his hemoglobin A1c is 5.4 so I am very satisfied with these results.  proBNP was normal as well.  2023.Patient called concerned because he had a chest x-ray with Dr. Austin that showed atherosclerotic calcification of the thoracic aorta.  I reassured him that this is a common finding, that does not mean he has intravascular obstruction, and that we just continue monitoring his risk factors controlling his cholesterol sugar and blood pressure etc. and he just had a recent stress test as well.  May 18, 2023.  Patient here in follow-up.  A lot going on in the interim.  Had a bout with transient global amnesia and then had another bout where he went to the emergency room thinking he had appendicitis but the scan was negative and everything resolved.  He also had a couple of gout attacks so finally his Hyzaar was changed to plain losartan but on 50 mg his blood pressure was high.  Dr. Austin increased him to 100 mg but the blood pressure still stayed high.  So now he was sent back here for further evaluation.  Only other issue is some anemia and his gastroenterologist moved up his colonoscopy from next February to next month.  No issues with chest pain shortness of breath etc. 2023.  Patient returned because he wanted to discuss blood pressure issues.  It seems on the new regimen I put him on without the diuretic his blood pressure remained elevated.  He saw a rheumatologist who agreed to give him allopurinol 100 mg along with a brief course of colchicine 0.6, and let him go back on the diuretic.  He went back to Hyzaar and his blood pressure numbers have been excellent.  If anything they are a little higher at home on his home monitor that he brought so he will bring it in next time either with me or Dr. Austin to make sure of its accuracy.  We also reviewed a number of other issues as well as some of which we had discussed previously 2024.  Patient returns in follow-up.  Doing well overall except has bronchitis and ever since he had COVID which damaged his lungs whenever he gets a cold it turns into bed bronchitis so he is now on prednisone 20 mg a day for the last 4 days.  Otherwise no interval medical or cardiac issues.  He did at the end ask about a coronary calcium score and after discussing the pros and cons he would still like to have one done.  No other interval medical issues hospitalizations etc.  We reviewed all his medications and he is on a good blood pressure regimen with Hyzaar 50/12.5 right now.  Still on atorvastatin 20. 2024 Had calcium score of 848, with 31 in the left main, 425 LAD, 61 left circumflex, 331 RCA. Last stress echo was 2022 with some upsloping ST depressions and a negative echo. Reviewed results with patient and we will schedule another stress echo. 2024 Patient came for stress echo due to the very high coronary artery calcium score. He was able to exercise for 9 minutes and 40 seconds to a heart rate of 150 and a blood pressure of 206/98. No EKG changes just mildly frequent VPCs. No echocardiographic evidence of ischemia. Patient at peak had small area of chest pain which then resolved very early in recovery. It looks like the NSTs were maybe starting to go down but they went back to baseline at just 30 seconds of recovery and stayed there throughout recovery and as mentioned again no echo evidence of ischemia. After discussion with the patient on the one hand it seems the symptom was very fleeting and mild and he has had it before without exertion or by the same token he was very concerned with a high score and with his family history and he wanted to do a CT angio of the coronaries. After much discussion I agreed.  2024.CTA of the coronaries done. Suggestive of at least moderate stenosis in proximal to mid LAD, moderate to severe in distal LAD, and severe and distal circumflex. Long discussion with patient. Prefers to have angiogram which I will schedule for him. In the meantime has lost 15 pounds with his GLP-1 agonist and would be interested in changing to Wegovy as it does have an indication for cardiac disease as well. 2024.  Cath on  showed a 90% proximal LAD stenosis and a 90% mid LAD stenosis that were both treated with drug-eluting stents. There was also a 30% stenosis in the mid RCA and 40% in the distal RCA. Patient was discharged without problems and will be seeing me in follow-up before he goes away. DAPT for 6 months. 2024. Patient here today.  EKG is sinus rhythm at 24 and a normal tracing.  Blood pressure excellent.  Weight is down about 15 pounds. His arm is a little sore this in the right arm (he says he felt the catheter going up his arm and it was a little uncomfortable) but I reassured him that that will probably go away in a few days.  We discussed the dual antiplatelet therapy.  He has been taking Zepound but he is trying to switch over to Wegovy 0.5 mg weekly and we will try to prescribe that.  We also discussed getting his LDL below 70 and we discussed his mildly elevated ESQUIVEL score in January but given his weight loss he wants to wait and repeat that may be in 6 months or longer.  2024.  Patient returns in follow-up. He has continued to lose weight with diet and Wegovy now 1.0 mg.  Because his weight was coming down he stopped his Hyzaar and made an appointment to come see me 2 weeks later to see about his blood pressure.  His home numbers have been very good although starting to creep up a little and that got him nervous.  Absolutely no cardiac symptoms with exertion etc.  No other change in his medications. His EKG is sinus rhythm at 60 and a totally normal tracing. 2024.  Patient returns in follow-up.  He is doing so well on Wegovy in terms of weight loss that he does not want to lose anymore.  First he tried to make it every other week but that did not seem to agree with him so he is asking about cutting back to 1.0 mg.  He is seeing his endocrinologist later on this week about his thyroid so I told him to check with the endocrinologist as well but most likely that is what we will do.  Meanwhile it is more than 6 months now since his stents so we will stop aspirin and just continue the clopidogrel.  He is in good spirits with no other complaints or issues but always worried about his blood pressure and again I reassured him about fluctuations in blood pressure etc.  His repeat blood pressure at the end of the exam today was perfectly normal.  He has no chest pain shortness of breath etc. March 3, 2025.  Patient returns in follow-up needing a cardiac clearance for arthroscopic right shoulder surgery for rotator cuff tear.  Since his stent he has been asymptomatic and is now only on single antiplatelet agent as it is more than 6 months from his stent.  He did have an episode about 3 to 4 weeks ago of severe epistaxis and had to have some nasal packing until it stopped but this has not recurred.  Currently he is on Plavix as the single antiplatelet agent but prior to the surgery he will be switched to baby aspirin for the duration of the surgery and then afterwards will be switched back.  He denies chest pain shortness of breath etc. May 12, 2025.  This was an old appointment that was there before his preop visit in March.  However before the surgery and since his surgery his blood pressure was running very high and he had losartan HCT 50/12.5 at home and started taking it and has monitored his pressure at home and it has been much better.  He came in today for follow-up to discuss this and his blood pressure here was excellent.  He so far has maintained his weight, is still on the Wegovy.  No chest pain or shortness of breath.  Concerned because a friend of his who had a few stents just recently got 3 more.  His shoulder surgery went very well and he currently has no exertional chest pain or shortness of breath.  He is on no pain medicines or anti-inflammatories.

## 2025-05-12 NOTE — CARDIOLOGY SUMMARY
[de-identified] : December 6, 2022.  Exercised 11 minutes to a heart rate of 160 and a blood pressure of 158/98.  Fatigue but no chest pain.  Minimal upsloping ST depressions not meeting criteria for ischemia and returning to baseline by 1 minute recovery.  Rare VPCs with 1 couplet in recovery. [de-identified] : December 6, 2022.  MAC with mild MR.  Calcified trileaflet aortic valve with normal opening.  No AI.  Aorta 3.6 at the sinus of Valsalva and 3.5 cm ascending aorta.  Normal left atrium.  Normal LV size and systolic function with LVEF 72%.  Normal diastolic function.  Normal RV size and function with mild TR.  RVSP 37.  Normal pericardium with no effusion.

## 2025-05-12 NOTE — ASSESSMENT
[FreeTextEntry1] : 70-year-old with multiple risk factors including family history. Fatigue since COVID. EKG with some nonspecific changes and may be minimal IVCD. No significant findings on exam. Blood pressure had been controlled until he had to stop the diuretic part of his Hyzaar because of 2 gout attacks. Now blood pressure running high even on 100 mg of losartan. I added 5 mg of amlodipine and cut the losartan back to 50. Warned him about possible edema and reassured him it is not dangerous if he does get some. His blood pressure continues to run high so he saw a rheumatologist who gave him allopurinol 100 mg daily (along with a short course of colchicine 0.6 daily) and therefore he went back on his own to [facility] and has had excellent blood pressure response once again. He is happy staying with that even if he gets erectile dysfunction from the diuretic. He did get a blood pressure cuff at home and his numbers are mostly in the 120s although occasionally diastolic is high 80s; he will bring the cuff with him on his next visit with Dr. Austin or his follow-up visit with me in November which will be 1 year from his last visit. He had recent labs with Dr. Austin so I will obtain those and review them as well. Patient returned January 16, 2024. Blood pressure has been good with the diuretic part of Hyzaar and he has not had a recurrence of any gout while taking allopurinol 200 mg a day. No significant symptoms other than his current URI for which she is on prednisone as mentioned above related to his previous lung damage from COVID. He asked about Ozempic or 1 of those medicines just for a few months and I reiterated how everyone who stops them puts the weight back but they might be some logic for staying on it long-term if he has enough cardiac risk factors, metabolic syndrome, fatty liver etc. He was also very interested in a coronary artery calcium score. He was part of a study at [facility] over 30 years ago and he had a low score then but not 0. I explained that it was not likely things will change much based on the score although perhaps we would get even stricter about his LDL level etc. and he wanted to test so I gave him a requisition for it and a phone number to schedule it through [facility]. The meantime labs were sent and we will review his medicines and adjust them based on his labs and his calcium score. His EKG was sinus rhythm at 68 and otherwise unremarkable. Patient returned April 12, 2024 after gradual workup for calcium score to CTA finally led to coronary angiography and he had 90% lesion in both his proximal and mid LAD that were both treated with drug-eluting stents. He is doing fine with just some soreness in his right arm. His exam is unremarkable except he has now lost 15 pounds with GLP-1 agonists. Blood pressure is excellent. Good pedal pulses. EKG is normal sinus rhythm and remains in normal tracing. Reviewed all of the current issues. We will try to keep his LDL below 70 so we may have to increase his atorvastatin depending on today's results. He now wants to try to get approved for Wegovy because of cost. We discussed issues involving dual antiplatelet therapy going forward which we will try to continue for 6 months. Patient returned July 30, 2024. He has lost 31 pounds between diet and Wegovy now 1.0 mg weekly. No cardiac symptoms. Because of the weight loss and his blood pressure numbers at home he decided to stop his Hyzaar 2 weeks before his appointment with me. His home readings are still excellent and the reading I got here was satisfactory as well so it is okay to stay off the Hyzaar for now. He will monitor his blood pressure at home just a couple of times a week and call me if he notices it going up. He claims his A1c was down to 4 back in April from the Wegovy but he has not had any visual or retinal issues. His EKG remains sinus rhythm and within normal limits. Patient returned November 11, 2024. He had his Wegovy increased to 1.7 mg weekly but now feels like he is losing too much weight and wants to cut back. He will discuss this with his endocrinologist coming up soon and if that is okay I will cut him back to 1.0 mg weekly. No cardiac symptoms or complaints. His EKG is sinus rhythm at 68 and otherwise within normal limits. He is now more than 6 months since his stent so he no longer needs DAPT so we decided to stop the aspirin and just continue the clopidogrel (which might also be better for his GERD). Still anxious about his fluctuating blood pressure and again I tried to reassure him. I am perfectly happy with his blood pressure of 124/80 at the end of the exam today.  Patient returned March 3, 2025 needing cardiac clearance for right arthroscopic shoulder surgery and had an echocardiogram as well. He is stable and asymptomatic with no exertional chest pain or shortness of breath. Only issue was an episode of epistaxis about 3 to 4 weeks ago which resolved. He remains on Plavix but will switch to baby aspirin 8 days prior to the surgery because of his LAD stent.. He has lost a lot of weight on Wegovy but will stop that preop as per the orthopedic surgeons wishes. His exam today is unremarkable and his EKG shows normal sinus rhythm at 65 and is a normal tracing. He had an echocardiogram that he was scheduled for showing normal LV systolic function with an ejection fraction of 65 to 70% with no wall motion abnormalities. Just has mild MR and mild TR and this was basically unchanged from 2 years ago.   Patient returns today May 12, 2025 concern about his blood pressure and as mentioned he put himself back on losartan 50/12.5.  His blood pressure is excellent both here and at home and he will bring in his machine with him for his next visit.  Meanwhile labs will be sent on the losartan HCT for renal function and electrolytes and we will also check his lipids and his hemoglobin A1c so he does not have to have that for another few months.

## 2025-05-12 NOTE — DISCUSSION/SUMMARY
[FreeTextEntry1] : Check labs on losartan HCT.  Also check lipids and hemoglobin A1c.  If blood pressure at home remains normal he can come back in 3 months for follow-up but if his blood pressures are a little erratic or he is concerned he will come back sooner.  He will make a note for himself to bring his blood pressure machine with him next visit.